# Patient Record
Sex: FEMALE | Race: WHITE | NOT HISPANIC OR LATINO | URBAN - METROPOLITAN AREA
[De-identification: names, ages, dates, MRNs, and addresses within clinical notes are randomized per-mention and may not be internally consistent; named-entity substitution may affect disease eponyms.]

---

## 2023-09-13 ENCOUNTER — OFFICE (OUTPATIENT)
Dept: URBAN - METROPOLITAN AREA CLINIC 27 | Facility: CLINIC | Age: 68
End: 2023-09-13

## 2023-09-13 VITALS
HEART RATE: 56 BPM | DIASTOLIC BLOOD PRESSURE: 73 MMHG | SYSTOLIC BLOOD PRESSURE: 132 MMHG | WEIGHT: 187 LBS | HEIGHT: 66 IN | TEMPERATURE: 98.1 F

## 2023-09-13 DIAGNOSIS — K25.9 GASTRIC ULCER, UNSPECIFIED AS ACUTE OR CHRONIC, WITHOUT HEMO: ICD-10-CM

## 2023-09-13 DIAGNOSIS — K26.9 DUODENAL ULCER, UNSPECIFIED AS ACUTE OR CHRONIC, WITHOUT HEM: ICD-10-CM

## 2023-09-13 DIAGNOSIS — R12 HEARTBURN: ICD-10-CM

## 2023-09-13 DIAGNOSIS — Z86.010 PERSONAL HISTORY OF COLONIC POLYPS: ICD-10-CM

## 2023-09-13 PROCEDURE — 99214 OFFICE O/P EST MOD 30 MIN: CPT | Performed by: NURSE PRACTITIONER

## 2023-10-03 VITALS
OXYGEN SATURATION: 97 % | SYSTOLIC BLOOD PRESSURE: 146 MMHG | SYSTOLIC BLOOD PRESSURE: 139 MMHG | DIASTOLIC BLOOD PRESSURE: 63 MMHG | WEIGHT: 180 LBS | SYSTOLIC BLOOD PRESSURE: 146 MMHG | OXYGEN SATURATION: 98 % | RESPIRATION RATE: 17 BRPM | TEMPERATURE: 97.3 F | DIASTOLIC BLOOD PRESSURE: 75 MMHG | RESPIRATION RATE: 9 BRPM | DIASTOLIC BLOOD PRESSURE: 62 MMHG | OXYGEN SATURATION: 99 % | RESPIRATION RATE: 15 BRPM | OXYGEN SATURATION: 100 % | RESPIRATION RATE: 9 BRPM | RESPIRATION RATE: 14 BRPM | HEART RATE: 56 BPM | HEART RATE: 66 BPM | RESPIRATION RATE: 17 BRPM | SYSTOLIC BLOOD PRESSURE: 132 MMHG | DIASTOLIC BLOOD PRESSURE: 67 MMHG | SYSTOLIC BLOOD PRESSURE: 132 MMHG | SYSTOLIC BLOOD PRESSURE: 114 MMHG | DIASTOLIC BLOOD PRESSURE: 73 MMHG | TEMPERATURE: 97.3 F | SYSTOLIC BLOOD PRESSURE: 136 MMHG | HEART RATE: 59 BPM | SYSTOLIC BLOOD PRESSURE: 129 MMHG | OXYGEN SATURATION: 98 % | SYSTOLIC BLOOD PRESSURE: 139 MMHG | DIASTOLIC BLOOD PRESSURE: 67 MMHG | HEART RATE: 60 BPM | SYSTOLIC BLOOD PRESSURE: 129 MMHG | HEART RATE: 56 BPM | DIASTOLIC BLOOD PRESSURE: 65 MMHG | OXYGEN SATURATION: 94 % | HEART RATE: 59 BPM | SYSTOLIC BLOOD PRESSURE: 131 MMHG | OXYGEN SATURATION: 100 % | SYSTOLIC BLOOD PRESSURE: 131 MMHG | SYSTOLIC BLOOD PRESSURE: 114 MMHG | RESPIRATION RATE: 10 BRPM | HEART RATE: 66 BPM | SYSTOLIC BLOOD PRESSURE: 129 MMHG | HEART RATE: 57 BPM | WEIGHT: 180 LBS | RESPIRATION RATE: 14 BRPM | HEART RATE: 58 BPM | DIASTOLIC BLOOD PRESSURE: 75 MMHG | OXYGEN SATURATION: 99 % | HEIGHT: 66 IN | OXYGEN SATURATION: 98 % | RESPIRATION RATE: 15 BRPM | DIASTOLIC BLOOD PRESSURE: 63 MMHG | RESPIRATION RATE: 14 BRPM | DIASTOLIC BLOOD PRESSURE: 68 MMHG | HEART RATE: 61 BPM | RESPIRATION RATE: 16 BRPM | SYSTOLIC BLOOD PRESSURE: 117 MMHG | SYSTOLIC BLOOD PRESSURE: 114 MMHG | RESPIRATION RATE: 16 BRPM | OXYGEN SATURATION: 100 % | SYSTOLIC BLOOD PRESSURE: 132 MMHG | OXYGEN SATURATION: 95 % | DIASTOLIC BLOOD PRESSURE: 75 MMHG | RESPIRATION RATE: 11 BRPM | SYSTOLIC BLOOD PRESSURE: 131 MMHG | HEIGHT: 66 IN | OXYGEN SATURATION: 94 % | DIASTOLIC BLOOD PRESSURE: 62 MMHG | HEART RATE: 58 BPM | HEART RATE: 56 BPM | SYSTOLIC BLOOD PRESSURE: 154 MMHG | HEART RATE: 61 BPM | OXYGEN SATURATION: 99 % | RESPIRATION RATE: 10 BRPM | SYSTOLIC BLOOD PRESSURE: 136 MMHG | RESPIRATION RATE: 9 BRPM | OXYGEN SATURATION: 95 % | SYSTOLIC BLOOD PRESSURE: 117 MMHG | RESPIRATION RATE: 15 BRPM | OXYGEN SATURATION: 94 % | SYSTOLIC BLOOD PRESSURE: 117 MMHG | TEMPERATURE: 97.3 F | DIASTOLIC BLOOD PRESSURE: 65 MMHG | DIASTOLIC BLOOD PRESSURE: 67 MMHG | DIASTOLIC BLOOD PRESSURE: 73 MMHG | OXYGEN SATURATION: 97 % | SYSTOLIC BLOOD PRESSURE: 139 MMHG | HEART RATE: 60 BPM | HEART RATE: 66 BPM | OXYGEN SATURATION: 97 % | SYSTOLIC BLOOD PRESSURE: 136 MMHG | HEART RATE: 57 BPM | OXYGEN SATURATION: 95 % | DIASTOLIC BLOOD PRESSURE: 73 MMHG | HEART RATE: 61 BPM | RESPIRATION RATE: 10 BRPM | HEART RATE: 60 BPM | RESPIRATION RATE: 17 BRPM | RESPIRATION RATE: 16 BRPM | DIASTOLIC BLOOD PRESSURE: 63 MMHG | SYSTOLIC BLOOD PRESSURE: 154 MMHG | SYSTOLIC BLOOD PRESSURE: 154 MMHG | HEART RATE: 58 BPM | HEART RATE: 59 BPM | HEIGHT: 66 IN | DIASTOLIC BLOOD PRESSURE: 65 MMHG | DIASTOLIC BLOOD PRESSURE: 68 MMHG | HEART RATE: 57 BPM | SYSTOLIC BLOOD PRESSURE: 146 MMHG | DIASTOLIC BLOOD PRESSURE: 62 MMHG | RESPIRATION RATE: 11 BRPM | DIASTOLIC BLOOD PRESSURE: 68 MMHG | WEIGHT: 180 LBS | RESPIRATION RATE: 11 BRPM

## 2023-10-06 ENCOUNTER — OFFICE (OUTPATIENT)
Dept: URBAN - METROPOLITAN AREA PATHOLOGY 2 | Facility: PATHOLOGY | Age: 68
End: 2023-10-06
Payer: COMMERCIAL

## 2023-10-06 ENCOUNTER — AMBULATORY SURGICAL CENTER (OUTPATIENT)
Dept: URBAN - METROPOLITAN AREA SURGERY 12 | Facility: SURGERY | Age: 68
End: 2023-10-06
Payer: COMMERCIAL

## 2023-10-06 ENCOUNTER — AMBULATORY SURGICAL CENTER (OUTPATIENT)
Dept: URBAN - METROPOLITAN AREA SURGERY 12 | Facility: SURGERY | Age: 68
End: 2023-10-06

## 2023-10-06 DIAGNOSIS — K31.A12 GASTRIC INTESTINAL METAPLASIA WITHOUT DYSPLASIA, INVOLVING T: ICD-10-CM

## 2023-10-06 DIAGNOSIS — K44.9 DIAPHRAGMATIC HERNIA WITHOUT OBSTRUCTION OR GANGRENE: ICD-10-CM

## 2023-10-06 DIAGNOSIS — K31.89 OTHER DISEASES OF STOMACH AND DUODENUM: ICD-10-CM

## 2023-10-06 DIAGNOSIS — K22.2 ESOPHAGEAL OBSTRUCTION: ICD-10-CM

## 2023-10-06 DIAGNOSIS — K25.9 GASTRIC ULCER, UNSPECIFIED AS ACUTE OR CHRONIC, WITHOUT HEMO: ICD-10-CM

## 2023-10-06 DIAGNOSIS — K21.00 GASTRO-ESOPHAGEAL REFLUX DISEASE WITH ESOPHAGITIS, WITHOUT B: ICD-10-CM

## 2023-10-06 PROBLEM — K22.89 OTHER SPECIFIED DISEASE OF ESOPHAGUS: Status: ACTIVE | Noted: 2023-10-06

## 2023-10-06 PROCEDURE — 43450 DILATE ESOPHAGUS 1/MULT PASS: CPT | Performed by: INTERNAL MEDICINE

## 2023-10-06 PROCEDURE — 43239 EGD BIOPSY SINGLE/MULTIPLE: CPT | Performed by: INTERNAL MEDICINE

## 2023-10-06 PROCEDURE — 88342 IMHCHEM/IMCYTCHM 1ST ANTB: CPT | Performed by: PATHOLOGY

## 2023-10-06 PROCEDURE — 88313 SPECIAL STAINS GROUP 2: CPT | Performed by: PATHOLOGY

## 2023-10-06 PROCEDURE — 88305 TISSUE EXAM BY PATHOLOGIST: CPT | Performed by: PATHOLOGY

## 2024-07-30 ENCOUNTER — OFFICE (OUTPATIENT)
Dept: URBAN - METROPOLITAN AREA CLINIC 26 | Facility: CLINIC | Age: 69
End: 2024-07-30
Payer: COMMERCIAL

## 2024-07-30 VITALS
HEART RATE: 85 BPM | TEMPERATURE: 98.7 F | DIASTOLIC BLOOD PRESSURE: 75 MMHG | WEIGHT: 190 LBS | SYSTOLIC BLOOD PRESSURE: 118 MMHG | HEIGHT: 66 IN

## 2024-07-30 DIAGNOSIS — R12 HEARTBURN: ICD-10-CM

## 2024-07-30 DIAGNOSIS — R10.9 UNSPECIFIED ABDOMINAL PAIN: ICD-10-CM

## 2024-07-30 DIAGNOSIS — R11.0 NAUSEA: ICD-10-CM

## 2024-07-30 PROCEDURE — 99214 OFFICE O/P EST MOD 30 MIN: CPT | Performed by: NURSE PRACTITIONER

## 2024-07-30 RX ORDER — BROMPHENIRAMINE MALEATE, DEXTROMETHORPHAN HBR, PHENYLEPHRINE HCL, DIPHENHYDRAMINE HCL, PHENYLEPHRINE HCL 0.52G
KIT ORAL
Qty: 120 | Refills: 0 | Status: ACTIVE
Start: 2024-07-30

## 2024-07-30 NOTE — SERVICEHPINOTES
Patient presents today as a urgent visit due to abdominal pain with abnormal CT findings. She was instructed by her PCP that her SB was infected and that she needed to follow up with GI. In addition she is requesting antibiotics in the form of an injection. 
katiana saab She describes epigastric pain that radiates to the back as well as left upper quadrant pain -- intermittent. This is described as a sharp pain that transitions to cramping. Increases with activity and eating. Is triggered by all foods and water. Pain has been present since December 2023.
katiana saab Nausea has also been present since December 2023. No vomiting. Heartburn noted on a daily basis. Has been using Pantoprazole a couple times per week. Heartburn is triggered with any acidic foods -- primarily red sauces. Appetite has not been good. Reports having to maker herself eat. No weight loss. No dysphagia. No odynophagia. Daily BM. Some constipation. Will eat prunes that she does not have a BM.  When eating out at a restaurant will have fecal urgency and diarrhea. No rectal bleeding or melena. Denies ASA. Denies NSAIDs
katiana saab   Reports the onset of chills yesterday and woke up with a sore throat. Took Zyrtec for congestion however she reports Zyrtec causes lethargy. Chills were so pronounced that she had to sleep with multiple layers last night. katiana

## 2024-07-30 NOTE — SERVICEROSSYSTEMNOTES
Pt here to follow up on her CT scan results. Pt reports lower left sided pain that goes around to her back, she gets this when she eats, and with movement.  Pt reports she had the chills last night.

## 2024-07-30 NOTE — SERVICENOTES
** Discuss lifestyle modifications with your Primary Care Provider
    including any of the following: 
 - Dietary Approaches to Stop Hypertension (DASH) 
 - DASH Dietary Plan
 - Dietary Sodium Restrictions
 - Increased Physical Activity 
 - Alcohol Moderation / Cessation